# Patient Record
Sex: FEMALE | Race: WHITE
[De-identification: names, ages, dates, MRNs, and addresses within clinical notes are randomized per-mention and may not be internally consistent; named-entity substitution may affect disease eponyms.]

---

## 2017-10-04 ENCOUNTER — HOSPITAL ENCOUNTER (OUTPATIENT)
Dept: HOSPITAL 19 - MC.RAD | Age: 67
End: 2017-10-04
Attending: INTERNAL MEDICINE
Payer: COMMERCIAL

## 2017-10-04 DIAGNOSIS — Z12.31: Primary | ICD-10-CM

## 2018-11-09 ENCOUNTER — HOSPITAL ENCOUNTER (OUTPATIENT)
Dept: HOSPITAL 19 - MC.RAD | Age: 68
End: 2018-11-09
Attending: INTERNAL MEDICINE
Payer: COMMERCIAL

## 2018-11-09 DIAGNOSIS — Z12.31: Primary | ICD-10-CM

## 2018-12-19 ENCOUNTER — HOSPITAL ENCOUNTER (OUTPATIENT)
Dept: HOSPITAL 19 - COL.LAB | Age: 68
End: 2018-12-19
Attending: INTERNAL MEDICINE
Payer: COMMERCIAL

## 2018-12-19 DIAGNOSIS — Z00.00: Primary | ICD-10-CM

## 2018-12-19 LAB
ALBUMIN SERPL-MCNC: 4.3 GM/DL (ref 3.5–5)
ALP SERPL-CCNC: 78 U/L (ref 50–136)
ALT SERPL-CCNC: 26 U/L (ref 9–52)
ANION GAP SERPL CALC-SCNC: 5 MMOL/L (ref 7–16)
AST SERPL-CCNC: 28 U/L (ref 15–37)
BASOPHILS # BLD: 0 10*3/UL (ref 0–0.2)
BASOPHILS NFR BLD AUTO: 0.7 % (ref 0–2)
BILIRUB SERPL-MCNC: 0.6 MG/DL (ref 0–1)
BUN SERPL-MCNC: 15 MG/DL (ref 7–17)
CALCIUM SERPL-MCNC: 9.7 MG/DL (ref 8.4–10.2)
CHLORIDE SERPL-SCNC: 106 MMOL/L (ref 98–107)
CHOLEST SPEC-SCNC: 227 MG/DL (ref 120–200)
CHOLEST/HDLC SERPL-SRTO: 4.4
CO2 SERPL-SCNC: 33 MMOL/L (ref 22–30)
CREAT SERPL-SCNC: 0.71 MG/DL (ref 0.52–1.25)
EOSINOPHIL # BLD: 0.1 10*3/UL (ref 0–0.7)
EOSINOPHIL NFR BLD: 1.2 % (ref 0–4)
ERYTHROCYTE [DISTWIDTH] IN BLOOD BY AUTOMATED COUNT: 12 % (ref 11.5–14.5)
GLUCOSE SERPL-MCNC: 105 MG/DL (ref 74–106)
GRANULOCYTES # BLD AUTO: 72.9 % (ref 42.2–75.2)
HCT VFR BLD AUTO: 42.3 % (ref 37–47)
HDLC SERPL-MCNC: 51 MG/DL
HGB BLD-MCNC: 14.2 G/DL (ref 12.5–16)
LDLC SERPL-MCNC: 154 MG/DL
LYMPHOCYTES # BLD: 1.2 10*3/UL (ref 1.2–3.4)
LYMPHOCYTES NFR BLD: 19.2 % (ref 20–51)
MCH RBC QN AUTO: 31 PG (ref 27–31)
MCHC RBC AUTO-ENTMCNC: 34 G/DL (ref 33–37)
MCV RBC AUTO: 92 FL (ref 80–100)
MONOCYTES # BLD: 0.3 10*3/UL (ref 0.1–0.6)
MONOCYTES NFR BLD AUTO: 5.7 % (ref 1.7–9.3)
NEUTROPHILS # BLD: 4.4 10*3/UL (ref 1.4–6.5)
PH UR STRIP.AUTO: 7 [PH] (ref 5–8)
PLATELET # BLD AUTO: 259 K/MM3 (ref 130–400)
PMV BLD AUTO: 9.7 FL (ref 7.4–10.4)
POTASSIUM SERPL-SCNC: 5 MMOL/L (ref 3.4–5)
PROT SERPL-MCNC: 7.3 GM/DL (ref 6.4–8.2)
RBC # BLD AUTO: 4.58 M/MM3 (ref 4.1–5.3)
SODIUM SERPL-SCNC: 144 MMOL/L (ref 137–145)
SP GR UR STRIP.AUTO: 1.01 (ref 1–1.03)
TRIGL SERPL-MCNC: 111 MG/DL
TSH SERPL DL<=0.005 MIU/L-ACNC: 1.02 UIU/ML (ref 0.47–4.68)
URN COLLECT METHOD CLASS: (no result)

## 2020-12-10 ENCOUNTER — HOSPITAL ENCOUNTER (OUTPATIENT)
Dept: HOSPITAL 19 - COL.RAD | Age: 70
End: 2020-12-10
Attending: ORTHOPAEDIC SURGERY
Payer: COMMERCIAL

## 2020-12-10 DIAGNOSIS — S52.512A: ICD-10-CM

## 2020-12-10 DIAGNOSIS — M18.12: Primary | ICD-10-CM

## 2021-02-24 ENCOUNTER — HOSPITAL ENCOUNTER (OUTPATIENT)
Dept: HOSPITAL 19 - WSOT | Age: 71
LOS: 51 days | Discharge: HOME | End: 2021-04-16
Attending: PHYSICIAN ASSISTANT
Payer: COMMERCIAL

## 2021-02-24 DIAGNOSIS — S52.512A: Primary | ICD-10-CM

## 2021-05-06 ENCOUNTER — HOSPITAL ENCOUNTER (OUTPATIENT)
Dept: HOSPITAL 19 - MC.RAD | Age: 71
End: 2021-05-06
Attending: INTERNAL MEDICINE
Payer: COMMERCIAL

## 2021-05-06 DIAGNOSIS — Z12.31: Primary | ICD-10-CM

## 2022-07-06 ENCOUNTER — HOSPITAL ENCOUNTER (OUTPATIENT)
Dept: HOSPITAL 19 - SDCO | Age: 72
Discharge: HOME | End: 2022-07-06
Attending: INTERNAL MEDICINE
Payer: MEDICARE

## 2022-07-06 VITALS — DIASTOLIC BLOOD PRESSURE: 78 MMHG | HEART RATE: 69 BPM | TEMPERATURE: 97.6 F | SYSTOLIC BLOOD PRESSURE: 123 MMHG

## 2022-07-06 VITALS — WEIGHT: 177.25 LBS | BODY MASS INDEX: 29.53 KG/M2 | HEIGHT: 65 IN

## 2022-07-06 VITALS — TEMPERATURE: 97.5 F | HEART RATE: 83 BPM | DIASTOLIC BLOOD PRESSURE: 72 MMHG | SYSTOLIC BLOOD PRESSURE: 112 MMHG

## 2022-07-06 VITALS — HEART RATE: 71 BPM | DIASTOLIC BLOOD PRESSURE: 73 MMHG | SYSTOLIC BLOOD PRESSURE: 111 MMHG

## 2022-07-06 DIAGNOSIS — Z12.11: Primary | ICD-10-CM

## 2022-07-06 DIAGNOSIS — K62.1: ICD-10-CM

## 2022-07-06 DIAGNOSIS — K57.32: ICD-10-CM

## 2022-07-06 NOTE — NUR
1005 - PT dismissed from endo via wheelchair to PT entrence by Kina CHEEK.
PT has DC packet and personal belonings; and was transferred into the care of
her , who is driving private truck.

## 2022-07-06 NOTE — NUR
0930 - VSS. PT states desire to be discharged. Call bell remains within reach.
PT has finished toast and continued to drink; denies nausea.

## 2022-07-06 NOTE — NUR
0915 - PT arrives and ambulated from cart to chair 2:1; warm blankes and
monitors applied. VSS. PT oriented to room and call bell, within reach. PT
denies pain and nausea. Verbal room report obtained from Debbie CHEEK. PT provided
with ice water, hot coffee, cream, sugar and toast w/ strawberry jam per
request. Visitor is present. Will monitor per intervals.

## 2022-07-06 NOTE — NUR
0945 -  VSS. IV discontinued. Catheter tip intact. Pressure bandage applied.
NO redness or swelling noted. DC instructions and edcuational material
reviewed with the PT, who verbalized understanding and signed the related
paperwork. Questions answered to PT satisfaction. PT denied needing RN
assistance changing, however, call bell remains within reach. Visitor remains
present.

## 2024-10-01 ENCOUNTER — HOSPITAL ENCOUNTER (OUTPATIENT)
Dept: HOSPITAL 19 - MC.RAD | Age: 74
End: 2024-10-01
Payer: MEDICARE

## 2024-10-01 DIAGNOSIS — Z12.31: Primary | ICD-10-CM
